# Patient Record
Sex: FEMALE | Race: BLACK OR AFRICAN AMERICAN | NOT HISPANIC OR LATINO | URBAN - METROPOLITAN AREA
[De-identification: names, ages, dates, MRNs, and addresses within clinical notes are randomized per-mention and may not be internally consistent; named-entity substitution may affect disease eponyms.]

---
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
## 2025-07-17 NOTE — ED BEHAVIORAL HEALTH ASSESSMENT NOTE - DESCRIPTION
Simple: Patient demonstrates quick and easy understanding As per HPI     Vital Signs Last 24 Hrs  T(C): 37.2 (11 Feb 2022 14:47), Max: 37.2 (11 Feb 2022 14:47)  T(F): 98.9 (11 Feb 2022 14:47), Max: 98.9 (11 Feb 2022 14:47)  HR: 97 (11 Feb 2022 14:47) (97 - 97)  BP: 109/52 (11 Feb 2022 14:47) (109/52 - 109/52)  BP(mean): --  RR: 16 (11 Feb 2022 14:47) (16 - 16)  SpO2: 96% (11 Feb 2022 14:47) (96% - 96%) None. As per HPI